# Patient Record
Sex: MALE | URBAN - METROPOLITAN AREA
[De-identification: names, ages, dates, MRNs, and addresses within clinical notes are randomized per-mention and may not be internally consistent; named-entity substitution may affect disease eponyms.]

---

## 2022-07-28 ENCOUNTER — APPOINTMENT (OUTPATIENT)
Dept: URBAN - METROPOLITAN AREA CLINIC 252 | Age: 57
Setting detail: DERMATOLOGY
End: 2022-07-29

## 2022-07-28 VITALS — HEIGHT: 66 IN | WEIGHT: 174 LBS | RESPIRATION RATE: 16 BRPM

## 2022-07-28 DIAGNOSIS — L82.0 INFLAMED SEBORRHEIC KERATOSIS: ICD-10-CM

## 2022-07-28 DIAGNOSIS — C: ICD-10-CM

## 2022-07-28 DIAGNOSIS — L30.4 ERYTHEMA INTERTRIGO: ICD-10-CM

## 2022-07-28 DIAGNOSIS — L81.4 OTHER MELANIN HYPERPIGMENTATION: ICD-10-CM

## 2022-07-28 DIAGNOSIS — L82.1 OTHER SEBORRHEIC KERATOSIS: ICD-10-CM

## 2022-07-28 PROBLEM — C79.9 SECONDARY MALIGNANT NEOPLASM OF UNSPECIFIED SITE: Status: ACTIVE | Noted: 2022-07-28

## 2022-07-28 PROCEDURE — 99203 OFFICE O/P NEW LOW 30 MIN: CPT | Mod: 25

## 2022-07-28 PROCEDURE — OTHER LIQUID NITROGEN: OTHER

## 2022-07-28 PROCEDURE — OTHER PRESCRIPTION: OTHER

## 2022-07-28 PROCEDURE — OTHER COUNSELING: OTHER

## 2022-07-28 PROCEDURE — OTHER ADDITIONAL NOTES: OTHER

## 2022-07-28 PROCEDURE — 17110 DESTRUCT B9 LESION 1-14: CPT

## 2022-07-28 RX ORDER — TRIAMCINOLONE ACETONIDE 0.25 MG/G
OINTMENT TOPICAL BID
Qty: 80 | Refills: 0 | Status: ERX | COMMUNITY
Start: 2022-07-28

## 2022-07-28 ASSESSMENT — LOCATION SIMPLE DESCRIPTION DERM
LOCATION SIMPLE: POSTERIOR SCALP
LOCATION SIMPLE: LEFT UPPER BACK
LOCATION SIMPLE: RIGHT THIGH
LOCATION SIMPLE: LEFT ANTERIOR NECK
LOCATION SIMPLE: LEFT ANTERIOR NECK
LOCATION SIMPLE: LEFT HAND
LOCATION SIMPLE: LEFT THIGH
LOCATION SIMPLE: RIGHT ZYGOMA

## 2022-07-28 ASSESSMENT — LOCATION ZONE DERM
LOCATION ZONE: LEG
LOCATION ZONE: NECK
LOCATION ZONE: TRUNK
LOCATION ZONE: FACE
LOCATION ZONE: NECK
LOCATION ZONE: SCALP
LOCATION ZONE: HAND

## 2022-07-28 ASSESSMENT — LOCATION DETAILED DESCRIPTION DERM
LOCATION DETAILED: LEFT ANTERIOR PROXIMAL THIGH
LOCATION DETAILED: LEFT MID-UPPER BACK
LOCATION DETAILED: RIGHT CENTRAL ZYGOMA
LOCATION DETAILED: LEFT INFERIOR LATERAL NECK
LOCATION DETAILED: LEFT INFERIOR OCCIPITAL SCALP
LOCATION DETAILED: LEFT INFERIOR LATERAL NECK
LOCATION DETAILED: LEFT RADIAL DORSAL HAND
LOCATION DETAILED: RIGHT ANTERIOR PROXIMAL THIGH

## 2022-07-28 NOTE — PROCEDURE: LIQUID NITROGEN
Post-Care Instructions: - Avoid picking at any of the treated lesions.\\n- Blisters should not be popped. However should a blister rupture, cover it with Vaseline ointment or Aquaphor and a bandage until healed.
Medical Necessity Information: It is in your best interest to select a reason for this procedure from the list below. All of these items fulfill various CMS LCD requirements except the new and changing color options.
Show Applicator Variable?: Yes
Spray Paint Text: The liquid nitrogen was applied to the skin utilizing a spray paint frosting technique.
Medical Necessity Clause: This procedure was medically necessary because the lesions that were treated were:
Spray Paint Technique: No
Detail Level: Detailed
Consent: - Discussed the risks of the cryosurgery in detail.\\n- Verbal and written consent was obtained, and risks were reviewed prior to procedure today. \\n- Risks discussed include but are not limited to pain, crusting, scabbing, blistering, scarring, temporary or permanent darker or lighter pigmentary change, recurrence, incomplete resolution, and infection.

## 2022-07-28 NOTE — PROCEDURE: COUNSELING
Detail Level: Simple
Detail Level: Detailed
Detail Level: Zone
Patient Specific Counseling (Will Not Stick From Patient To Patient): After this resolved use gold bond corn starch powder qd to bid for maintenance

## 2022-07-28 NOTE — HPI: SKIN LESION
Is This A New Presentation, Or A Follow-Up?: Skin Lesion
Additional History: Grown since starting, biopsy of a ly oh nodes showed squamous cell carcinoma. Has a pet scan next week. Current smoker but trying to quit. Has an upper scope. Hax a history of acid reflux. Seeing rad onc soon as well.
How Severe Is Your Skin Lesion?: mild

## 2022-07-28 NOTE — PROCEDURE: ADDITIONAL NOTES
Render Risk Assessment In Note?: no
Detail Level: Detailed
Additional Notes: Advised no cutaneous primary squamous cell carcinoma lesion is appreciated. Patient following up with oncology soon.

## 2024-03-22 ENCOUNTER — APPOINTMENT (OUTPATIENT)
Dept: URBAN - METROPOLITAN AREA CLINIC 252 | Age: 59
Setting detail: DERMATOLOGY
End: 2024-03-22

## 2024-03-22 VITALS — WEIGHT: 155 LBS | HEIGHT: 66 IN

## 2024-03-22 DIAGNOSIS — L98.8 OTHER SPECIFIED DISORDERS OF THE SKIN AND SUBCUTANEOUS TISSUE: ICD-10-CM

## 2024-03-22 DIAGNOSIS — D22 MELANOCYTIC NEVI: ICD-10-CM

## 2024-03-22 DIAGNOSIS — L72.8 OTHER FOLLICULAR CYSTS OF THE SKIN AND SUBCUTANEOUS TISSUE: ICD-10-CM

## 2024-03-22 DIAGNOSIS — L81.4 OTHER MELANIN HYPERPIGMENTATION: ICD-10-CM

## 2024-03-22 DIAGNOSIS — L57.3 POIKILODERMA OF CIVATTE: ICD-10-CM

## 2024-03-22 DIAGNOSIS — Z85.828 PERSONAL HISTORY OF OTHER MALIGNANT NEOPLASM OF SKIN: ICD-10-CM

## 2024-03-22 PROBLEM — D22.5 MELANOCYTIC NEVI OF TRUNK: Status: ACTIVE | Noted: 2024-03-22

## 2024-03-22 PROCEDURE — 99213 OFFICE O/P EST LOW 20 MIN: CPT

## 2024-03-22 PROCEDURE — OTHER COUNSELING: OTHER

## 2024-03-22 ASSESSMENT — LOCATION ZONE DERM
LOCATION ZONE: NECK
LOCATION ZONE: TRUNK
LOCATION ZONE: ARM

## 2024-03-22 ASSESSMENT — LOCATION SIMPLE DESCRIPTION DERM
LOCATION SIMPLE: LEFT ANTERIOR NECK
LOCATION SIMPLE: LEFT SHOULDER
LOCATION SIMPLE: LEFT FOREARM
LOCATION SIMPLE: RIGHT ANTERIOR NECK
LOCATION SIMPLE: RIGHT UPPER BACK
LOCATION SIMPLE: CHEST
LOCATION SIMPLE: LEFT UPPER BACK

## 2024-03-22 ASSESSMENT — LOCATION DETAILED DESCRIPTION DERM
LOCATION DETAILED: LEFT MEDIAL SUPERIOR CHEST
LOCATION DETAILED: LEFT DISTAL DORSAL FOREARM
LOCATION DETAILED: RIGHT SUPERIOR UPPER BACK
LOCATION DETAILED: LEFT SUPERIOR LATERAL UPPER BACK
LOCATION DETAILED: RIGHT SUPERIOR ANTERIOR NECK
LOCATION DETAILED: LEFT CLAVICULAR NECK
LOCATION DETAILED: LEFT POSTERIOR SHOULDER
LOCATION DETAILED: STERNUM
LOCATION DETAILED: RIGHT SUPERIOR MEDIAL UPPER BACK

## 2024-03-22 NOTE — PROCEDURE: COUNSELING
Detail Level: Simple
Patient Specific Counseling (Will Not Stick From Patient To Patient): Patient had an squamous cell carcinoma in 2022 that was treated with adjuvant radiation which then recurred and has metastasized to lungs. He is currently undergoing immunotherapy. Recommended full body skin exam every 3 months for 2 years. Continue to follow-up with oncology per they\\Lin request, he has an upcoming pet scan.
Detail Level: Detailed

## 2024-09-05 ENCOUNTER — TRANSCRIBE ORDERS (OUTPATIENT)
Dept: ONCOLOGY | Facility: CLINIC | Age: 59
End: 2024-09-05
Payer: COMMERCIAL

## 2024-09-05 ENCOUNTER — VIRTUAL VISIT (OUTPATIENT)
Dept: ONCOLOGY | Facility: CLINIC | Age: 59
End: 2024-09-05
Attending: NURSE ANESTHETIST, CERTIFIED REGISTERED
Payer: COMMERCIAL

## 2024-09-05 ENCOUNTER — PRE VISIT (OUTPATIENT)
Dept: ONCOLOGY | Facility: CLINIC | Age: 59
End: 2024-09-05
Payer: COMMERCIAL

## 2024-09-05 ENCOUNTER — PATIENT OUTREACH (OUTPATIENT)
Dept: ONCOLOGY | Facility: CLINIC | Age: 59
End: 2024-09-05
Payer: COMMERCIAL

## 2024-09-05 VITALS — HEIGHT: 66 IN | WEIGHT: 155 LBS | BODY MASS INDEX: 24.91 KG/M2

## 2024-09-05 DIAGNOSIS — C10.9 OROPHARYNGEAL CANCER (H): Primary | ICD-10-CM

## 2024-09-05 DIAGNOSIS — C10.9 OROPHARYNGEAL CANCER (H): ICD-10-CM

## 2024-09-05 PROCEDURE — 99417 PROLNG OP E/M EACH 15 MIN: CPT | Mod: 95 | Performed by: INTERNAL MEDICINE

## 2024-09-05 PROCEDURE — 99205 OFFICE O/P NEW HI 60 MIN: CPT | Mod: 95 | Performed by: INTERNAL MEDICINE

## 2024-09-05 ASSESSMENT — PAIN SCALES - GENERAL: PAINLEVEL: NO PAIN (0)

## 2024-09-05 NOTE — TELEPHONE ENCOUNTER
Action September 10, 2024 2:26 PM AF   Action Taken Slides from [facility] received and taken to 5th floor path lab for review.  Case: [id number] (19 slides)  Case: [id number] (15 slides)  Case: [id number] (16 slides)       Action September 5, 2024 11:00 AM ABT   Action Taken Called [facility], verbally requested all [id number] images, confirms images will be pushed    Called [facility], they only have 1 US image from 2022 US bx, will push over.     Called [facility], LVM for urgent images to be pushed to  PACS    11:50 AM  Images from [facility] and [facility] received and resolved to PACS     RECORDS STATUS - ALL OTHER DIAGNOSIS      RECORDS RECEIVED FROM:    NOTES STATUS DETAILS   OFFICE NOTE from referring provider  JOSE Fitch CRNA   [facility] 08/12[facility]: Dr. Matt Linares   [facility]   [facility] RE[facility] [place] [facility] ER   OPERATIVE REPORT [facility] 01/08/24: LEFT NECK DISSECTION    [facility] LIST [facility]    LABS     PATHOLOGY REPORTS Req 09/05-[facility] 01/18/24: [id number]  01/08/24: [id number]  12/26/23: [id number]   ANYTHING RELATED TO DIAGNOSIS [facility] Most recent 08/22/24   PATHOLOGY [employer] TRACKING   [facility] Tracking #: [id number]   IMAGING (NEED IMAGES & REPORT)     CT SCANS PACS [facility]:  05/09/24, 12/08/23: CT Neck  05/09/24, 12/08/23: CT Chest    [facility]:  07/11/23, 04/12/23: CT Chest    [facility]:   03/20/24: CT T-Spine, CT C-Spine, CT L-Spine  03/20/24: CT Head  01/18/24, 07/31/22: CT Chest  01/27/23: CT CAP   MRI PACS [facility]:  03/06/24: MR Brain    [facility]:   03/08/24: MR T-Spine, MR C-Spine, MR L-Spine   XRAYS PACS [facility]:   03/20/24: XR Myelogram  10/17/23: XR L-Spine  10/10/22, 10/04/22: XR Chest   ULTRASOUND PACS [facility]:  07/13/22: US Lymph node   PET PACS [facility]:  08/01/24-04/03/23: PET CT Skull

## 2024-09-05 NOTE — NURSING NOTE
Current patient location: [address] [place] [id number]    Is the patient currently in the state of [place]? YES    Visit mode:VIDEO    If the visit is dropped, the patient can be reconnected by: VIDEO VISIT: Text to cell phone:   Telephone Information:   Mobile [phone]       Will anyone else be joining the visit? Yes, pt's wife is with the pt and will be joining the video visit per pt  (If patient encounters technical issues they should call [phone])    How would you like to obtain your AVS? MyChart    Are changes needed to the allergy or medication list? Pt stated no changes to allergies and Pt stated no med changes    Are refills needed on medications prescribed by this physician? NO    Rooming Documentation:  Not applicable      Reason for visit: Consult    No other vitals to report per pt    Karlene CORTÉSF
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
## 2024-09-12 ENCOUNTER — LAB REQUISITION (OUTPATIENT)
Dept: LAB | Facility: CLINIC | Age: 59
End: 2024-09-12
Payer: COMMERCIAL

## 2024-09-12 PROCEDURE — 88321 CONSLTJ&REPRT SLD PREP ELSWR: CPT | Performed by: STUDENT IN AN ORGANIZED HEALTH CARE EDUCATION/TRAINING PROGRAM

## 2024-09-12 PROCEDURE — 88321 CONSLTJ&REPRT SLD PREP ELSWR: CPT | Performed by: PATHOLOGY

## 2024-09-13 LAB
PATH REPORT.COMMENTS IMP SPEC: NORMAL
PATH REPORT.FINAL DX SPEC: NORMAL
PATH REPORT.GROSS SPEC: NORMAL
PATH REPORT.MICROSCOPIC SPEC OTHER STN: NORMAL
PATH REPORT.RELEVANT HX SPEC: NORMAL
PATH REPORT.RELEVANT HX SPEC: NORMAL
PATH REPORT.SITE OF ORIGIN SPEC: NORMAL

## 2024-09-19 LAB
PATH REPORT.COMMENTS IMP SPEC: ABNORMAL
PATH REPORT.COMMENTS IMP SPEC: YES
PATH REPORT.FINAL DX SPEC: ABNORMAL
PATH REPORT.GROSS SPEC: ABNORMAL
PATH REPORT.MICROSCOPIC SPEC OTHER STN: ABNORMAL
PATH REPORT.RELEVANT HX SPEC: ABNORMAL
PATH REPORT.RELEVANT HX SPEC: ABNORMAL
PATH REPORT.SITE OF ORIGIN SPEC: ABNORMAL

## 2025-02-06 NOTE — PROGRESS NOTES
New Patient Oncology Nurse Navigator Note     Referring provider: Angie Valladares   of [facility]  [facility] Chair - Division of Medical Oncology  Community Out[facility] &  - [facility] Ne[facility] Disease [facility]    Referred to (specialty): Medical Oncology    Requested provider (if applicable): Ellen Trinidad       Date Referral Received: 9/5/2024     Evaluation for : clinical trials for HPV SCC oropharyngeal     Clinical History (per Nurse review of records provided):    **BOOK MARKED**   NOTES:    IMAGING:  [employer] [facility]  PET CT SKULL BASE TO MID THIGH SUBSEQUENT TREAT  IMPRESSION:     Decreasing metabolic activity of two adjacent left level II cervical   chain lymph nodes suggesting partial response to therapy.  Narrative    EXAM: CT CHEST w CONTRAST   LOCATION: [facility]   DATE: 8/1/2024     INDICATION: Malignant neoplasm of mouth, unspecified, Secondary   malignant neoplasm of right lung   COMPARISON: PET 5/17/2024, chest CT 5/9/2024   TECHNIQUE: CT chest with IV contrast. Multiplanar reformats were   obtained. Dose reduction techniques were used.     CONTRAST: 75 cc Omnipaque 350     FINDINGS:   LUNGS AND PLEURA: Mild airway secretions. Minimal upper lung   emphysematous changes. Stable solid 4 x 3 mm right upper lobe solid   nodule which was previously cavitary (series 3, image 134). As   before, the cavitary component remains no longer apparent. Stable 4   mm left upper lobe nodule (image 134). Few other nodules are stable   such as a 5 mm left lower lobe nodule (image 266), a 4 mm right upper   lobe nodule abutting right major fissure (image 147), and other   scattered micronodules. No new pulmonary nodule, pneumonic   consolidation, or pleural effusion. Calcified granulomata.     MEDIASTINUM/AXILLAE: Right chest wall port with tip in the distal   superior vena cava. No lymphadenopathy. No pericardial  effusion. No   thoracic aneurysm.     CORONARY ARTERY CALCIFICATION: Severe.     UPPER ABDOMEN: Increasing, mild splenomegaly. No actionable finding.     MUSCULOSKELETAL: No aggressive osseous lesion.       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   COMPARISON: Neck CT 05/09/2024.   CONTRAST: 50 mL Omnipaque 350 and 5 mL Heparin.   TECHNIQUE: Routine CT Soft Tissue Neck with IV contrast. Multiplanar   reformats. Dose reduction techniques were used.     FINDINGS: Left level II neck dissection with scattered surgical clips   and effacement of the fat planes. Multiple metastatic left level II   lymph nodes, not significantly changed.     Decreased size of left level Ib lymph node which remains mildly   enlarged and hyperenhancing.     Lobulated enhancing mass along the right lateral margin of the   submandibular gland measuring about 1.6 cm, unchanged.     Lobulated enhancing nodule along the anterior margin of the right   sternocleidomastoid muscle adjacent to the external jugular vein,   unchanged.     Small enhancing nodules within the midline anterior to the thyroid   cartilage, unchanged. Scattered pulmonary nodules/opacities. Refer to   chest report.     Lobulated enhancing cutaneous/subcutaneous nodules within the left   supraclavicular region, unchanged.     The oral cavity is partially obscured by streak artifact. The   visualized oral cavity, pharynx, hypopharynx, and larynx demonstrate   post-treatment changes and are otherwise without appreciable   abnormality. The salivary glands and thyroid gland are unremarkable.     Cervical spine degenerative changes. Major neck vasculature appears   patent.     EXAM: PET CT BODY SKULL BASE TO MID THIGH SUBSEQUENT   LOCATION: [facility]   DATE: 8/1/2024     INDICATION: Subsequent treatment  planning and restaging for malignant   neoplasm of head, face, and neck. Squamous cell carcinoma of unknown   primary status post chemoradiation to the head and neck region   completed and November 2022, left neck dissection in January 2024,   single agent immunotherapy from February 2 May 2024, currently   receiving chemotherapy/immunotherapy. Monitor treatment response   COMPARISON: FDG PET/CT dated 5/7/2024, and neck CT dated 8/1/2024,   and chest CT dated 8/1/2024   TECHNIQUE: Serum glucose level 96 mg/dL. One hour post intravenous   administration of 9.0 mCi F-18 FDG, PET imaging was performed from   the skull vertex to mid thighs, utilizing attenuation correction with   concurrent axial CT and PET/CT image fusion. Dose reduction   techniques were used.     FINDINGS: Decreased metabolic activity of two adjacent left level II   cervical chain lymph nodes (Max SUV 8.5, previously 15.4) suggesting   partial response to therapy.     Diffuse FDG uptake within the axial and appendicular skeleton in a   pattern typical of marrow stimulation. Diffuse esophagitis.     No acute intracranial abnormality. Left neck dissection changes.   Right chest port with tip terminating near the superior cavoatrial   junction. Mild to moderate coronary artery calcium. Lingular   scarring/atelectatic change. Non-FDG avid 4 mm nodule in the right   upper lobe, which is too small to accurately characterize by PET/CT.   Left renal cyst. Sigmoid diverticulosis. Mild prostamegaly. Pelvic   phleboliths. Multilevel degenerative changes of the spine.  Exam End: 08/01/24 10:15 AM    Specimen Collected: 08/01/24 10:12 AM Last Resulted: 08/01/24  1:03 PM   Received From: [facility]  Result Received: 09/05/24 10:07 AM     PATHOLOGY:  [facility]  01/26/2024:  Results   1. PD-L1 Combined Positive Score (CPS): 65 (Expression, CPS greater than or equal to 1)   2. 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  Dr. Linares requests p53 and PD-L1.  ~~~~~~~~~~~~~~~~~~~~~~~~~~~~~    01/18/2024  Component 7 mo ago Resulting Agency   Case Report  Medical Cytology Report                           Case: [id number]                                  Authorizing Provider:  Jasiel Gómez MBBS  Collected:           01/18/2024 1239              Ordering Location:     [facility]             Received:            01/18/2024 1337              Pathologist:           Yari Montalvo MD                                                          Specimen:    Right Upper Lobe Lung, RUL Nodule - needle,                                           [facility]   Addendum  1/29/24: Second addendum issued for a coding clarification.  The P40 immunohistochemical stain was interpreted by quantitative, manual morphometry (greater than 70% of tumor cells are positive). The final diagnosis remains unchanged.  [facility]   Addendum electronically signed by Yari Montalvo MD on 1/29/2024 at  3:14 PM    Addendum  1/24/24: Addendum issued for coding purposes. The p40 immunohistochemical stain was interpreted with a quantitative assessment (greater than 70% of tumor cells are positive). The final diagnosis remains unchanged.  [facility]   Addendum electronically signed by Yari Montalvo MD on 1/24/2024 at  2:51 PM    Final Diagnosis  A) RIGHT LUNG, UPPER LOBE, TRANSBRONCHIAL FINE NEEDLE ASPIRATION:   Positive for malignancy; squamous cell carcinoma, see comment  [facility]   Electronically signed by Yari Montalvo MD on 1/22/2024 at 11:54 AM    Comment  This squamous cell " Neurology carcinoma is p16 positive and is histologically similar to the patient's known squamous cell carcinoma of the head and neck ([id number]). The pathologic findings would be compatible with metastatic squamous cell carcinoma in the appropriate clinical setting. Radiologic correlation is necessary.       Slides available for [facility] NGS testing: None   Blocks available for [facility] NGS testing: None   Blocks available for tests using immunostains and/or FISH (requiring 100 cells): Tissue block A2 (limited tumor)        Clinical Assessment / Barriers to Care (Per Nurse): none noted       Records Location (Care Everywhere, Media, etc.): EP[facility] & CE ([facility] [place])     RECORDS NEEDED:  Last six months ALL   imaging pushed to PACS from [facility] [place]--thank you!!    Additional testing needed prior to consult: none     Endocrinology